# Patient Record
Sex: FEMALE | Race: WHITE | NOT HISPANIC OR LATINO | URBAN - METROPOLITAN AREA
[De-identification: names, ages, dates, MRNs, and addresses within clinical notes are randomized per-mention and may not be internally consistent; named-entity substitution may affect disease eponyms.]

---

## 2017-10-29 ENCOUNTER — APPOINTMENT (OUTPATIENT)
Dept: LAB | Age: 72
End: 2017-10-29
Attending: PHYSICIAN ASSISTANT
Payer: MEDICARE

## 2017-10-29 ENCOUNTER — TRANSCRIBE ORDERS (OUTPATIENT)
Dept: URGENT CARE | Age: 72
End: 2017-10-29

## 2017-10-29 ENCOUNTER — OFFICE VISIT (OUTPATIENT)
Dept: URGENT CARE | Age: 72
End: 2017-10-29
Payer: MEDICARE

## 2017-10-29 DIAGNOSIS — R35.0 FREQUENCY OF MICTURITION: ICD-10-CM

## 2017-10-29 PROCEDURE — 87077 CULTURE AEROBIC IDENTIFY: CPT

## 2017-10-29 PROCEDURE — 87186 SC STD MICRODIL/AGAR DIL: CPT

## 2017-10-29 PROCEDURE — G0463 HOSPITAL OUTPT CLINIC VISIT: HCPCS | Performed by: FAMILY MEDICINE

## 2017-10-29 PROCEDURE — 87086 URINE CULTURE/COLONY COUNT: CPT

## 2017-10-29 PROCEDURE — 99203 OFFICE O/P NEW LOW 30 MIN: CPT | Performed by: FAMILY MEDICINE

## 2017-10-29 PROCEDURE — 81002 URINALYSIS NONAUTO W/O SCOPE: CPT | Performed by: FAMILY MEDICINE

## 2017-10-31 ENCOUNTER — GENERIC CONVERSION - ENCOUNTER (OUTPATIENT)
Dept: OTHER | Facility: OTHER | Age: 72
End: 2017-10-31

## 2017-10-31 LAB — BACTERIA UR CULT: ABNORMAL

## 2017-11-01 NOTE — PROGRESS NOTES
Assessment  1  Urinary frequency (788 41) (R35 0)   2  Acute cystitis with hematuria (595 0) (N30 01)    Plan  Acute cystitis with hematuria    · Nitrofurantoin Monohyd Macro 100 MG Oral Capsule (Macrobid); TAKE 1 CAPSULE  TWICE DAILY UNTIL GONE  Urinary frequency    · (1) URINE CULTURE; Source:Urine, Clean Catch; Status:Active - Retrospective  Authorization; Requested [id number];    · Urine Dip Non-Automated- POC; Status:Resulted - Requires Verification,Retrospective  By Protocol Authorization;   Done: [id number] 10:29AM    Discussion/Summary  Discussion Summary:   Take antibiotic as directed  Recommend daily probiotic or eating yogurt with live cultures while on antibiotic  Push fluids  Urine culture will be sent for definitive evaluation  Results take approximately 72 hours to return  If you have not heard from our office by 84 hours after visit, please call the phone number at top of clinical summary for your results  take azo for symptomatic relief  This will turn your urine orange and may stain undergarments if any leakage  Understands and agrees with treatment plan: The treatment plan was reviewed with the patient/guardian  The patient/guardian understands and agrees with the treatment plan   Counseling Documentation With Imm: The patient was counseled regarding diagnostic results,-- instructions for management  Chief Complaint  1  Urinary Frequency  Chief Complaint Free Text Note Form: patient woke up this am with UTI s/s of frequency, lower pubic pressure and burning when she urinates- when she voided just now her urine was bloody- last UTI was 2-3 years ago      History of Present Illness  HPI: 68-year-old female with gross blood in urine that started this morning  Some urinary frequency  Review of Systems  Focused-Female:   Constitutional: No fever, no chills, feels well, no tiredness, no recent weight gain or loss     Cardiovascular: no complaints of slow or fast heart rate, no chest pain, no palpitations, no leg claudication or lower extremity edema  Respiratory: no complaints of shortness of breath, no wheezing, no dyspnea on exertion, no orthopnea or PND  Gastrointestinal: no complaints of abdominal pain, no constipation, no nausea or diarrhea, no vomiting, no bloody stools  Genitourinary: as noted in HPI  Past Medical History  Active Problems And Past Medical History Reviewed: The active problems and past medical history were reviewed and updated today  Social History   · Denied: History of Active smoker   · Social alcohol use (Z78 9)  Social History Reviewed: The social history was reviewed and updated today  Surgical History  Surgical History Reviewed: The surgical history was reviewed and updated today  Current Meds   1  Lipitor 10 MG Oral Tablet; Therapy: (Recorded:29Oct2017) to Recorded   2  Vagifem 10 MCG Vaginal Tablet; Therapy: (Recorded:29Oct2017) to Recorded    Allergies  1  Sulfa Drugs    Vitals  Signs   Recorded: [id number] 10:23AM   Temperature: 98 4 F  Heart Rate: 98  Respiration: 20  Systolic: 841  Diastolic: 72  Height: 5 ft 6 in  Weight: 145 lb   BMI Calculated: 23 4  BSA Calculated: 1 74  O2 Saturation: 98  LMP: n/a  Pain Scale: 4    Physical Exam    Constitutional Well-developed well-nourished female no acute distress  Pulmonary   Respiratory effort: No increased work of breathing or signs of respiratory distress  Auscultation of lungs: Clear to auscultation  Cardiovascular   Palpation of heart: Normal PMI, no thrills  Auscultation of heart: Normal rate and rhythm, normal S1 and S2, without murmurs  Abdomen soft, no CVAT or suprapubic tenderness to palpation     Psychiatric   Orientation to person, place, and time: Normal     Mood and affect: Normal        Results/Data  Urine Dip Non-Automated- POC 29Oct2017 10:29AM "[employer] [place]     Test Name Result Flag Reference   Color Reddish brown     Clarity Cloudy     Leukocytes large Nitrite unable to test     Blood large     Bilirubin neg     Urobilinogen unable to test     Protein 100     Ph 5 0     Specific Gravity 1 030     Ketone neg     Glucose neg     Color Reddish brown     Clarity Cloudy     Leukocytes large     Nitrite unable to test     Blood large     Bilirubin neg     Urobilinogen unable to test     Protein 100     Ph 5 0     Specific Gravity 1 030     Ketone neg     Glucose neg               Signatures   Electronically signed by : Carola Field [facility]; Oct 29 2017 10:39AM EST                       (Author)    Electronically signed by :  Carola [facility]; Oct 31 2017  1:30PM EST                       (Author)    Electronically signed by : Donis Simental DO; Oct 31 2017  3:36PM EST                       (Co-author)

## 2018-01-10 NOTE — MISCELLANEOUS
Message  Called to patient to inform of urine culture results  Patient is on appropriate antibiotic and states that she is feeling better  Signatures   Electronically signed by :  Tommy Mcdonald, [facility]; Oct 31 2017  1:29PM EST                       (Author)